# Patient Record
Sex: FEMALE | Race: OTHER | URBAN - METROPOLITAN AREA
[De-identification: names, ages, dates, MRNs, and addresses within clinical notes are randomized per-mention and may not be internally consistent; named-entity substitution may affect disease eponyms.]

---

## 2017-04-03 ENCOUNTER — APPOINTMENT (RX ONLY)
Dept: URBAN - METROPOLITAN AREA OTHER 10 | Facility: OTHER | Age: 77
Setting detail: DERMATOLOGY
End: 2017-04-03

## 2017-04-03 DIAGNOSIS — L30.1 DYSHIDROSIS [POMPHOLYX]: ICD-10-CM

## 2017-04-03 PROBLEM — E78.5 HYPERLIPIDEMIA, UNSPECIFIED: Status: ACTIVE | Noted: 2017-04-03

## 2017-04-03 PROBLEM — I10 ESSENTIAL (PRIMARY) HYPERTENSION: Status: ACTIVE | Noted: 2017-04-03

## 2017-04-03 PROCEDURE — ? PRESCRIPTION

## 2017-04-03 PROCEDURE — ? TREATMENT REGIMEN

## 2017-04-03 PROCEDURE — ? COUNSELING

## 2017-04-03 PROCEDURE — 99202 OFFICE O/P NEW SF 15 MIN: CPT

## 2017-04-03 RX ORDER — CLOBETASOL PROPIONATE 0.5 MG/G
CREAM TOPICAL BID
Qty: 60 | Refills: 0 | Status: ERX | COMMUNITY
Start: 2017-04-03

## 2017-04-03 RX ADMIN — CLOBETASOL PROPIONATE: 0.5 CREAM TOPICAL at 00:00

## 2017-04-03 ASSESSMENT — LOCATION ZONE DERM: LOCATION ZONE: HAND

## 2017-04-03 ASSESSMENT — LOCATION SIMPLE DESCRIPTION DERM
LOCATION SIMPLE: LEFT HAND
LOCATION SIMPLE: RIGHT HAND

## 2017-04-03 ASSESSMENT — LOCATION DETAILED DESCRIPTION DERM
LOCATION DETAILED: RIGHT RADIAL DORSAL HAND
LOCATION DETAILED: LEFT RADIAL DORSAL HAND

## 2017-04-03 NOTE — HPI: RASH
How Severe Is Your Rash?: mild
Is This A New Presentation, Or A Follow-Up?: Rash
Additional History: \\n\\nNew patient. Focused visit. Painful, cracking hands ,patient's primary gave cream but doesn't know the name

## 2017-04-03 NOTE — PROCEDURE: TREATMENT REGIMEN
Detail Level: Zone
Initiate Treatment: Clobetasol cream- apply to affected areas of hands twice daily for two weeks then PRN flares only, moisturize right after application and after washing hands, wear gloves while washing dishes

## 2017-04-24 ENCOUNTER — APPOINTMENT (RX ONLY)
Dept: URBAN - METROPOLITAN AREA OTHER 10 | Facility: OTHER | Age: 77
Setting detail: DERMATOLOGY
End: 2017-04-24

## 2017-04-24 DIAGNOSIS — L82.1 OTHER SEBORRHEIC KERATOSIS: ICD-10-CM

## 2017-04-24 DIAGNOSIS — L30.1 DYSHIDROSIS [POMPHOLYX]: ICD-10-CM | Status: IMPROVED

## 2017-04-24 PROCEDURE — ? TREATMENT REGIMEN

## 2017-04-24 PROCEDURE — 99213 OFFICE O/P EST LOW 20 MIN: CPT

## 2017-04-24 PROCEDURE — ? COUNSELING

## 2017-04-24 ASSESSMENT — LOCATION DETAILED DESCRIPTION DERM
LOCATION DETAILED: RIGHT RADIAL DORSAL HAND
LOCATION DETAILED: LEFT RADIAL DORSAL HAND

## 2017-04-24 ASSESSMENT — LOCATION ZONE DERM: LOCATION ZONE: HAND

## 2017-04-24 ASSESSMENT — LOCATION SIMPLE DESCRIPTION DERM
LOCATION SIMPLE: RIGHT HAND
LOCATION SIMPLE: LEFT HAND

## 2017-04-24 NOTE — PROCEDURE: TREATMENT REGIMEN
Otc Regimen: Gold bond cream
Continue Regimen: Clobetasol cream- apply to affected areas of hands twice daily for two weeks when flared only then Monday,Wednesday and Friday. Moisturize right after application and after washing hands, wear gloves while washing dishes
Detail Level: Zone

## 2017-11-14 RX ORDER — CLOBETASOL PROPIONATE 0.5 MG/G
CREAM TOPICAL BID
Qty: 60 | Refills: 0 | Status: ERX

## 2019-02-25 ENCOUNTER — APPOINTMENT (RX ONLY)
Dept: URBAN - METROPOLITAN AREA OTHER 10 | Facility: OTHER | Age: 79
Setting detail: DERMATOLOGY
End: 2019-02-25

## 2019-02-25 DIAGNOSIS — L84 CORNS AND CALLOSITIES: ICD-10-CM

## 2019-02-25 DIAGNOSIS — K13.0 DISEASES OF LIPS: ICD-10-CM

## 2019-02-25 PROBLEM — K21.9 GASTRO-ESOPHAGEAL REFLUX DISEASE WITHOUT ESOPHAGITIS: Status: ACTIVE | Noted: 2019-02-25

## 2019-02-25 PROCEDURE — 99213 OFFICE O/P EST LOW 20 MIN: CPT

## 2019-02-25 PROCEDURE — ? COUNSELING

## 2019-02-25 PROCEDURE — ? TREATMENT REGIMEN

## 2019-02-25 PROCEDURE — ? PRESCRIPTION

## 2019-02-25 RX ORDER — DESONIDE 0.5 MG/G
OINTMENT TOPICAL
Qty: 60 | Refills: 0 | Status: ERX | COMMUNITY
Start: 2019-02-25

## 2019-02-25 RX ADMIN — DESONIDE: 0.5 OINTMENT TOPICAL at 00:00

## 2019-02-25 ASSESSMENT — LOCATION DETAILED DESCRIPTION DERM
LOCATION DETAILED: LEFT ULNAR PALM
LOCATION DETAILED: LEFT INFERIOR VERMILION LIP
LOCATION DETAILED: RIGHT SUPERIOR VERMILION LIP
LOCATION DETAILED: LEFT SUPERIOR VERMILION LIP
LOCATION DETAILED: RIGHT ULNAR PALM
LOCATION DETAILED: RIGHT SUPERIOR VERMILION BORDER

## 2019-02-25 ASSESSMENT — LOCATION ZONE DERM
LOCATION ZONE: LIP
LOCATION ZONE: HAND

## 2019-02-25 ASSESSMENT — LOCATION SIMPLE DESCRIPTION DERM
LOCATION SIMPLE: LEFT HAND
LOCATION SIMPLE: LEFT LIP
LOCATION SIMPLE: RIGHT UPPER LIP
LOCATION SIMPLE: RIGHT HAND
LOCATION SIMPLE: RIGHT LIP

## 2019-02-25 NOTE — PROCEDURE: TREATMENT REGIMEN
Initiate Treatment: Desonide ointment bid x 2 weeks
Detail Level: Simple
Otc Regimen: Vaseline, dove
Discontinue Regimen: Blistex cobyeva

## 2019-02-25 NOTE — HPI: DISCOLORATION
How Severe Is Your Skin Discoloration?: moderate
Additional History: Lips are dry and when symptoms first started they would burn. She has applied abreva in the past. She applies Vaseline and blistex on lips.

## 2019-03-18 ENCOUNTER — APPOINTMENT (RX ONLY)
Dept: URBAN - METROPOLITAN AREA OTHER 10 | Facility: OTHER | Age: 79
Setting detail: DERMATOLOGY
End: 2019-03-18

## 2019-03-18 DIAGNOSIS — K13.0 DISEASES OF LIPS: ICD-10-CM | Status: WELL CONTROLLED

## 2019-03-18 PROCEDURE — 99213 OFFICE O/P EST LOW 20 MIN: CPT

## 2019-03-18 PROCEDURE — ? TREATMENT REGIMEN

## 2019-03-18 PROCEDURE — ? COUNSELING

## 2019-03-18 ASSESSMENT — LOCATION DETAILED DESCRIPTION DERM
LOCATION DETAILED: RIGHT SUPERIOR VERMILION LIP
LOCATION DETAILED: LEFT INFERIOR VERMILION LIP
LOCATION DETAILED: LEFT SUPERIOR VERMILION LIP
LOCATION DETAILED: RIGHT SUPERIOR VERMILION BORDER

## 2019-03-18 ASSESSMENT — LOCATION SIMPLE DESCRIPTION DERM
LOCATION SIMPLE: RIGHT UPPER LIP
LOCATION SIMPLE: LEFT LIP
LOCATION SIMPLE: RIGHT LIP

## 2019-03-18 ASSESSMENT — LOCATION ZONE DERM: LOCATION ZONE: LIP

## 2019-03-18 NOTE — PROCEDURE: TREATMENT REGIMEN
Modify Regimen: Desonide ointment bid x 2 weeks as needed for flares
Otc Regimen: Continue applying Vaseline once daily
Detail Level: Simple
Plan: Pt was advised to start keeping a food journal to see what could possibly be causing flare ups

## 2020-04-22 ENCOUNTER — APPOINTMENT (RX ONLY)
Dept: URBAN - METROPOLITAN AREA OTHER 10 | Facility: OTHER | Age: 80
Setting detail: DERMATOLOGY
End: 2020-04-22

## 2020-04-22 DIAGNOSIS — L81.0 POSTINFLAMMATORY HYPERPIGMENTATION: ICD-10-CM

## 2020-04-22 PROCEDURE — 99213 OFFICE O/P EST LOW 20 MIN: CPT

## 2020-04-22 PROCEDURE — ? COUNSELING

## 2020-04-22 PROCEDURE — ? ORDER TESTS

## 2020-04-22 PROCEDURE — ? TREATMENT REGIMEN

## 2020-04-22 ASSESSMENT — LOCATION DETAILED DESCRIPTION DERM
LOCATION DETAILED: LEFT INFERIOR VERMILION LIP
LOCATION DETAILED: RIGHT INFERIOR VERMILION LIP

## 2020-04-22 ASSESSMENT — LOCATION SIMPLE DESCRIPTION DERM
LOCATION SIMPLE: RIGHT LIP
LOCATION SIMPLE: LEFT LIP

## 2020-04-22 ASSESSMENT — LOCATION ZONE DERM: LOCATION ZONE: LIP

## 2020-04-22 NOTE — PROCEDURE: COUNSELING
Detail Level: Simple
Patient Specific Counseling (Will Not Stick From Patient To Patient): \\nPatient denies associated symptoms with discoloration.  Denies exposure c/w fixed drug.  Denies itching, flaking or irritation c/w contact.  No use of lipstick or lip balm.  She is using whitening tooth past.  \\nadvised to use plain toothpaste only - avoiding all additives.\\n\\nWill check baseline labs for evidence of underlying systemic disease, particularly Addisons.

## 2020-04-22 NOTE — PROCEDURE: ORDER TESTS
Performing Laboratory: [id number]
Bill For Surgical Tray: no
Billing Type: Third-Party Bill
Expected Date Of Service: 04/22/2020
Lab Facility: [id number]
Clinical Notes (To The Lab): plasma Cortisol

## 2020-06-24 ENCOUNTER — ERX REFILL RESPONSE (OUTPATIENT)
Age: 80
End: 2020-06-24

## 2020-06-24 RX ORDER — DICYCLOMINE HYDROCHLORIDE 20 MG/1
TAKE 1 TABLET(20 MG) BY MOUTH FOUR TIMES DAILY TABLET ORAL
Qty: 120 | Refills: 0

## 2020-10-27 ENCOUNTER — ERX REFILL RESPONSE (OUTPATIENT)
Age: 80
End: 2020-10-27

## 2020-10-27 RX ORDER — MESALAMINE 375 MG/1
TAKE 4 CAPSULES(1500 MG) BY MOUTH EVERY DAY IN THE MORNING CAPSULE, EXTENDED RELEASE ORAL
Qty: 120 | Refills: 0

## 2021-03-02 ENCOUNTER — ERX REFILL RESPONSE (OUTPATIENT)
Dept: URBAN - METROPOLITAN AREA CLINIC 40 | Facility: CLINIC | Age: 81
End: 2021-03-02

## 2021-03-02 RX ORDER — DICYCLOMINE HYDROCHLORIDE 20 MG/1
TAKE 1 TABLET(20 MG) BY MOUTH FOUR TIMES DAILY TABLET ORAL
Qty: 120 | Refills: 0

## 2021-03-24 ENCOUNTER — P2P PATIENT RECORD (OUTPATIENT)
Age: 81
End: 2021-03-24

## 2021-03-25 ENCOUNTER — OFFICE VISIT (OUTPATIENT)
Dept: URBAN - METROPOLITAN AREA CLINIC 74 | Facility: CLINIC | Age: 81
End: 2021-03-25
Payer: COMMERCIAL

## 2021-03-25 ENCOUNTER — LAB OUTSIDE AN ENCOUNTER (OUTPATIENT)
Dept: URBAN - METROPOLITAN AREA CLINIC 74 | Facility: CLINIC | Age: 81
End: 2021-03-25

## 2021-03-25 ENCOUNTER — WEB ENCOUNTER (OUTPATIENT)
Dept: URBAN - METROPOLITAN AREA CLINIC 74 | Facility: CLINIC | Age: 81
End: 2021-03-25

## 2021-03-25 DIAGNOSIS — K51.90 ULCERATIVE COLITIS: ICD-10-CM

## 2021-03-25 PROCEDURE — 99214 OFFICE O/P EST MOD 30 MIN: CPT | Performed by: INTERNAL MEDICINE

## 2021-03-25 RX ORDER — DICYCLOMINE HYDROCHLORIDE 20 MG/1
TAKE 1 TABLET(20 MG) BY MOUTH FOUR TIMES DAILY TABLET ORAL
Qty: 120 | Refills: 0 | Status: DISCONTINUED | COMMUNITY

## 2021-03-25 RX ORDER — ALENDRONATE SODIUM 70 MG/1
TABLET ORAL
Qty: 0 | Refills: 0 | Status: ACTIVE | COMMUNITY
Start: 1900-01-01

## 2021-03-25 RX ORDER — OMEPRAZOLE 20 MG/1
CAPSULE, DELAYED RELEASE ORAL
Qty: 0 | Refills: 0 | Status: ACTIVE | COMMUNITY
Start: 1900-01-01

## 2021-03-25 RX ORDER — ASPIRIN 81 MG/1
TABLET, COATED ORAL
Qty: 0 | Refills: 0 | Status: ACTIVE | COMMUNITY
Start: 1900-01-01

## 2021-03-25 RX ORDER — ZOLPIDEM TARTRATE 10 MG/1
TABLET, FILM COATED ORAL
Qty: 0 | Refills: 0 | Status: ACTIVE | COMMUNITY
Start: 1900-01-01

## 2021-03-25 RX ORDER — MESALAMINE 375 MG/1
TAKE 4 CAPSULES (1,500 MG) BY ORAL ROUTE ONCE DAILY IN THE MORNING FOR 30 DAYS CAPSULE, EXTENDED RELEASE ORAL
Qty: 120 | Refills: 0
Start: 2020-04-27

## 2021-03-25 RX ORDER — MESALAMINE 375 MG/1
TAKE 4 CAPSULES (1,500 MG) BY ORAL ROUTE ONCE DAILY IN THE MORNING FOR 30 DAYS CAPSULE, EXTENDED RELEASE ORAL
Qty: 120 | Refills: 5 | Status: ACTIVE | COMMUNITY
Start: 2020-04-27

## 2021-03-25 RX ORDER — SIMVASTATIN 40 MG/1
TABLET, FILM COATED ORAL
Qty: 0 | Refills: 0 | Status: ACTIVE | COMMUNITY
Start: 1900-01-01

## 2021-03-25 RX ORDER — HYDROCHLOROTHIAZIDE 25 MG/1
TABLET ORAL
Qty: 0 | Refills: 0 | Status: ACTIVE | COMMUNITY
Start: 1900-01-01

## 2021-03-25 RX ORDER — SODIUM, POTASSIUM,MAG SULFATES 17.5-3.13G
17.5-13.3-1.6 GM/177ML SOLUTION, RECONSTITUTED, ORAL ORAL
Qty: 354 MILILITER | Refills: 0 | OUTPATIENT
Start: 2021-03-25 | End: 2021-03-26

## 2021-03-25 RX ORDER — MESALAMINE 375 MG/1
TAKE 4 CAPSULES(1500 MG) BY MOUTH EVERY DAY IN THE MORNING CAPSULE, EXTENDED RELEASE ORAL
Qty: 120 | Refills: 0 | Status: DISCONTINUED | COMMUNITY

## 2021-03-25 NOTE — PHYSICAL EXAM CONSTITUTIONAL:
Overweight female well developed, well nourished , in no acute distress , ambulating without difficulty , normal communication ability

## 2021-03-27 LAB
A/G RATIO: 1.2
ALBUMIN: 3.8
ALKALINE PHOSPHATASE: 75
ALT (SGPT): 14
APPEARANCE: CLEAR
AST (SGOT): 17
BACTERIA: (no result)
BASO (ABSOLUTE): 0.1
BASOS: 1
BILIRUBIN, TOTAL: 0.2
BILIRUBIN: NEGATIVE
BUN/CREATININE RATIO: 16
BUN: 16
C-REACTIVE PROTEIN, QUANT: 6
CALCIUM: 10.1
CARBON DIOXIDE, TOTAL: 23
CAST TYPE: (no result)
CASTS: PRESENT
CHLORIDE: 106
COMMENT: (no result)
CREATININE: 1.03
CRYSTAL TYPE: (no result)
CRYSTALS: (no result)
EGFR IF AFRICN AM: 60
EGFR IF NONAFRICN AM: 52
EOS (ABSOLUTE): 0
EOS: 0
EPITHELIAL CELLS (NON RENAL): (no result)
EPITHELIAL CELLS (RENAL): (no result)
GLOBULIN, TOTAL: 3.2
GLUCOSE: 128
GLUCOSE: NEGATIVE
HEMATOCRIT: 40
HEMATOLOGY COMMENTS:: (no result)
HEMOGLOBIN: 13.4
IMMATURE CELLS: (no result)
IMMATURE GRANS (ABS): 0
IMMATURE GRANULOCYTES: 0
KETONES: NEGATIVE
LYMPHS (ABSOLUTE): 2.5
LYMPHS: 26
MCH: 29.4
MCHC: 33.5
MCV: 88
MICROSCOPIC EXAMINATION: (no result)
MICROSCOPIC EXAMINATION: (no result)
MONOCYTES(ABSOLUTE): 0.7
MONOCYTES: 7
MUCUS THREADS: PRESENT
NEUTROPHILS (ABSOLUTE): 6.4
NEUTROPHILS: 66
NITRITE, URINE: NEGATIVE
NRBC: (no result)
OCCULT BLOOD: NEGATIVE
PH: 7
PLATELETS: 167
POTASSIUM: 3.9
PROTEIN, TOTAL: 7
PROTEIN: NEGATIVE
RBC: (no result)
RBC: 4.56
RDW: 12.5
SODIUM: 144
SPECIFIC GRAVITY: 1.01
TRICHOMONAS: (no result)
URINE-COLOR: YELLOW
UROBILINOGEN,SEMI-QN: 0.2
WBC ESTERASE: (no result)
WBC: (no result)
WBC: 9.6
YEAST: (no result)

## 2021-04-05 ENCOUNTER — TELEPHONE ENCOUNTER (OUTPATIENT)
Dept: URBAN - METROPOLITAN AREA CLINIC 40 | Facility: CLINIC | Age: 81
End: 2021-04-05

## 2021-04-05 RX ORDER — DICYCLOMINE HYDROCHLORIDE 20 MG/1
1 TABLET TABLET ORAL
Qty: 360 TABLET | Refills: 0

## 2021-07-14 ENCOUNTER — TELEPHONE ENCOUNTER (OUTPATIENT)
Dept: URBAN - METROPOLITAN AREA CLINIC 98 | Facility: CLINIC | Age: 81
End: 2021-07-14

## 2021-07-14 RX ORDER — DICYCLOMINE HYDROCHLORIDE 20 MG/1
1 TABLET TABLET ORAL
Qty: 360 TABLET | Refills: 0
End: 2021-10-12

## 2021-10-14 ENCOUNTER — TELEPHONE ENCOUNTER (OUTPATIENT)
Dept: URBAN - METROPOLITAN AREA SURGERY CENTER 30 | Facility: SURGERY CENTER | Age: 81
End: 2021-10-14

## 2021-10-14 RX ORDER — DICYCLOMINE HYDROCHLORIDE 20 MG/1
1 TABLET TABLET ORAL
Qty: 360 TABLET | Refills: 3

## 2021-10-18 ENCOUNTER — TELEPHONE ENCOUNTER (OUTPATIENT)
Dept: URBAN - METROPOLITAN AREA CLINIC 40 | Facility: CLINIC | Age: 81
End: 2021-10-18

## 2021-10-18 ENCOUNTER — TELEPHONE ENCOUNTER (OUTPATIENT)
Dept: URBAN - METROPOLITAN AREA CLINIC 74 | Facility: CLINIC | Age: 81
End: 2021-10-18

## 2021-11-08 ENCOUNTER — ERX REFILL RESPONSE (OUTPATIENT)
Dept: URBAN - METROPOLITAN AREA CLINIC 40 | Facility: CLINIC | Age: 81
End: 2021-11-08

## 2021-11-08 ENCOUNTER — OFFICE VISIT (OUTPATIENT)
Dept: URBAN - METROPOLITAN AREA TELEHEALTH 2 | Facility: TELEHEALTH | Age: 81
End: 2021-11-08
Payer: COMMERCIAL

## 2021-11-08 ENCOUNTER — TELEPHONE ENCOUNTER (OUTPATIENT)
Dept: URBAN - METROPOLITAN AREA CLINIC 74 | Facility: CLINIC | Age: 81
End: 2021-11-08

## 2021-11-08 DIAGNOSIS — K51.80 CHRONIC PANCOLONIC ULCERATIVE COLITIS: ICD-10-CM

## 2021-11-08 DIAGNOSIS — K21.9 GERD: ICD-10-CM

## 2021-11-08 PROCEDURE — 99213 OFFICE O/P EST LOW 20 MIN: CPT | Performed by: PHYSICIAN ASSISTANT

## 2021-11-08 RX ORDER — TRAMADOL HYDROCHLORIDE 50 MG/1
1 TABLET AS NEEDED TABLET, FILM COATED ORAL ONCE A DAY
Status: ACTIVE | COMMUNITY

## 2021-11-08 RX ORDER — OMEPRAZOLE 40 MG/1
1 CAPSULE 30 MINUTES BEFORE MORNING MEAL CAPSULE, DELAYED RELEASE ORAL ONCE A DAY
Qty: 30 | Refills: 0

## 2021-11-08 RX ORDER — MESALAMINE 375 MG/1
TAKE 4 CAPSULES (1,500 MG) BY ORAL ROUTE ONCE DAILY IN THE MORNING FOR 30 DAYS CAPSULE, EXTENDED RELEASE ORAL
Qty: 120 | Refills: 0 | Status: ACTIVE | COMMUNITY
Start: 2020-04-27

## 2021-11-08 RX ORDER — HYDROCHLOROTHIAZIDE 25 MG/1
TABLET ORAL
Qty: 0 | Refills: 0 | Status: ACTIVE | COMMUNITY
Start: 1900-01-01

## 2021-11-08 RX ORDER — IRBESARTAN 150 MG/1
1 TABLET TABLET, FILM COATED ORAL ONCE A DAY
Refills: 0 | Status: ACTIVE | COMMUNITY
Start: 1900-01-01

## 2021-11-08 RX ORDER — ASPIRIN 81 MG/1
TABLET, COATED ORAL
Qty: 0 | Refills: 0 | Status: ACTIVE | COMMUNITY
Start: 1900-01-01

## 2021-11-08 RX ORDER — OMEPRAZOLE 40 MG/1
1 CAPSULE 30 MINUTES BEFORE MORNING MEAL CAPSULE, DELAYED RELEASE ORAL ONCE A DAY
Qty: 30 | Refills: 0 | OUTPATIENT

## 2021-11-08 RX ORDER — ZOLPIDEM TARTRATE 5 MG/1
1 TABLET AT BEDTIME AS NEEDED TABLET, FILM COATED ORAL ONCE A DAY
Refills: 0 | Status: ACTIVE | COMMUNITY
Start: 1900-01-01

## 2021-11-08 RX ORDER — OMEPRAZOLE 40 MG/1
1 CAPSULE 30 MINUTES BEFORE MORNING MEAL CAPSULE, DELAYED RELEASE ORAL ONCE A DAY
Refills: 0 | Status: ACTIVE | COMMUNITY
Start: 1900-01-01

## 2021-11-08 RX ORDER — OMEPRAZOLE 40 MG/1
TAKE 1 CAPSULE BY MOUTH EVERY DAY 30 MINUTES BEFORE BREAKFAST CAPSULE, DELAYED RELEASE ORAL
Qty: 90 CAPSULE | Refills: 1 | OUTPATIENT

## 2021-11-08 RX ORDER — MESALAMINE 375 MG/1
4 CAPSULES IN THE MORNING CAPSULE, EXTENDED RELEASE ORAL ONCE A DAY
Qty: 120 CAPSULE | Refills: 0
Start: 2020-04-27 | End: 2021-12-08

## 2021-11-08 RX ORDER — POLYETHYLENE GLYCOL 3350, SODIUM SULFATE, SODIUM CHLORIDE, POTASSIUM CHLORIDE, ASCORBIC ACID, SODIUM ASCORBATE 140-9-5.2G
AS DIRECTED KIT ORAL ONCE
Qty: 1 CONTAINER | Refills: 0 | OUTPATIENT

## 2021-11-08 RX ORDER — ALENDRONATE SODIUM 70 MG/1
TABLET ORAL
Qty: 0 | Refills: 0 | Status: ACTIVE | COMMUNITY
Start: 1900-01-01

## 2021-11-08 RX ORDER — DICYCLOMINE HYDROCHLORIDE 20 MG/1
1 TABLET TABLET ORAL
Qty: 360 TABLET | Refills: 3 | Status: DISCONTINUED | COMMUNITY

## 2021-11-08 RX ORDER — SIMVASTATIN 40 MG/1
TABLET, FILM COATED ORAL
Qty: 0 | Refills: 0 | Status: ACTIVE | COMMUNITY
Start: 1900-01-01

## 2021-11-08 NOTE — HPI-TODAY'S VISIT:
Ms. Baez is a 81 year old female who presents to telehealth visit for follow-up.  We have been following her for ulcerative colitis, last seen by Dr. Wood 3/25/21.  She has been managed on Apriso.  Patient states she is still doing well with that having a bowel movement 1-2 times a day.  She denies any rectal bleeding.  Denies any abdominal pain.  She denies any nausea.  She denies any reflux.  She is due for surveillance colonoscopy.  Colonoscopy in April 2019 was significant for tubular adenomas removed.  Her surveillance biopsies were negative for active colitis.  She denies any extraintestinal manifestations such as mouth ulcers or sores.  She has had some issues with change in her skin color around her lips and planned to have a dermatology referral. Does well on dicyclomine prn abdominal spasms. Admits mild depression, not sleeping well. Appetite is good. Weight stable. Has plans for bilateral eye surgery next month. Continues omeprazole daily. Has not completed bloodwork since earlier this year, February, she believes. However, she completed labs with us in March, mildly elevated Cr of 1.03, decrease of eGFR. Normal HFP, CBC and CRP.

## 2021-12-18 LAB
A/G RATIO: 1.5
ALBUMIN: 4
ALKALINE PHOSPHATASE: 68
ALT (SGPT): 12
APPEARANCE: CLEAR
AST (SGOT): 14
BACTERIA: (no result)
BILIRUBIN, TOTAL: 0.3
BILIRUBIN: NEGATIVE
BUN/CREATININE RATIO: 13
BUN: 12
C-REACTIVE PROTEIN, QUANT: 4
CALCIUM: 10.1
CARBON DIOXIDE, TOTAL: 26
CAST TYPE: (no result)
CASTS: (no result)
CHLORIDE: 107
COMMENT: (no result)
CREATININE: 0.94
CRYSTAL TYPE: (no result)
CRYSTALS: (no result)
EGFR IF AFRICN AM: 67
EGFR IF NONAFRICN AM: 58
EPITHELIAL CELLS (NON RENAL): >10
EPITHELIAL CELLS (RENAL): (no result)
GLOBULIN, TOTAL: 2.7
GLUCOSE: 128
GLUCOSE: NEGATIVE
HEMATOCRIT: 39.3
HEMOGLOBIN: 12.8
KETONES: NEGATIVE
MCH: 28.4
MCHC: 32.6
MCV: 87
MICROSCOPIC EXAMINATION: (no result)
MUCUS THREADS: (no result)
NITRITE, URINE: NEGATIVE
NRBC: (no result)
OCCULT BLOOD: NEGATIVE
PH: 6.5
PLATELETS: 167
POTASSIUM: 4.4
PROTEIN, TOTAL: 6.7
PROTEIN: NEGATIVE
RBC: (no result)
RBC: 4.5
RDW: 12.7
SODIUM: 145
SPECIFIC GRAVITY: 1.01
TRICHOMONAS: (no result)
URINE-COLOR: YELLOW
UROBILINOGEN,SEMI-QN: 0.2
WBC ESTERASE: (no result)
WBC: (no result)
WBC: 7.3
YEAST: (no result)

## 2021-12-29 ENCOUNTER — TELEPHONE ENCOUNTER (OUTPATIENT)
Dept: URBAN - METROPOLITAN AREA CLINIC 40 | Facility: CLINIC | Age: 81
End: 2021-12-29

## 2021-12-29 RX ORDER — DICYCLOMINE HYDROCHLORIDE 20 MG/1
1 TABLET TABLET ORAL
Qty: 360 TABLET | Refills: 0

## 2022-01-24 ENCOUNTER — OFFICE VISIT (OUTPATIENT)
Dept: URBAN - METROPOLITAN AREA SURGERY CENTER 30 | Facility: SURGERY CENTER | Age: 82
End: 2022-01-24
Payer: COMMERCIAL

## 2022-01-24 DIAGNOSIS — D12.6 ADENOMA DETERMINED BY COLORECTAL BIOPSY: ICD-10-CM

## 2022-01-24 DIAGNOSIS — K51.00 ACUTE ULCERATIVE PANCOLITIS: ICD-10-CM

## 2022-01-24 PROCEDURE — 45380 COLONOSCOPY AND BIOPSY: CPT | Performed by: INTERNAL MEDICINE

## 2022-01-24 PROCEDURE — G8907 PT DOC NO EVENTS ON DISCHARG: HCPCS | Performed by: INTERNAL MEDICINE

## 2022-02-03 ENCOUNTER — TELEPHONE ENCOUNTER (OUTPATIENT)
Dept: URBAN - METROPOLITAN AREA CLINIC 40 | Facility: CLINIC | Age: 82
End: 2022-02-03

## 2022-02-03 RX ORDER — MESALAMINE 375 MG/1
4 CAPSULES IN THE MORNING CAPSULE, EXTENDED RELEASE ORAL ONCE A DAY
Qty: 360 | Refills: 1
Start: 2020-04-27 | End: 2022-08-02

## 2022-02-10 ENCOUNTER — ERX REFILL RESPONSE (OUTPATIENT)
Dept: URBAN - METROPOLITAN AREA CLINIC 40 | Facility: CLINIC | Age: 82
End: 2022-02-10

## 2022-02-10 RX ORDER — OMEPRAZOLE 40 MG/1
TAKE 1 CAPSULE BY MOUTH EVERY DAY 30 MINUTES BEFORE BREAKFAST CAPSULE, DELAYED RELEASE ORAL
Qty: 90 CAPSULE | Refills: 1 | OUTPATIENT

## 2022-02-17 ENCOUNTER — OFFICE VISIT (OUTPATIENT)
Dept: URBAN - METROPOLITAN AREA CLINIC 74 | Facility: CLINIC | Age: 82
End: 2022-02-17
Payer: COMMERCIAL

## 2022-02-17 DIAGNOSIS — K21.9 GERD: ICD-10-CM

## 2022-02-17 DIAGNOSIS — K51.90 ULCERATIVE COLITIS: ICD-10-CM

## 2022-02-17 PROCEDURE — 99213 OFFICE O/P EST LOW 20 MIN: CPT | Performed by: INTERNAL MEDICINE

## 2022-02-17 RX ORDER — IRBESARTAN 150 MG/1
1 TABLET TABLET, FILM COATED ORAL ONCE A DAY
Refills: 0 | Status: ACTIVE | COMMUNITY
Start: 1900-01-01

## 2022-02-17 RX ORDER — ALENDRONATE SODIUM 70 MG/1
TABLET ORAL
Qty: 0 | Refills: 0 | Status: ACTIVE | COMMUNITY
Start: 1900-01-01

## 2022-02-17 RX ORDER — MESALAMINE 375 MG/1
4 CAPSULES IN THE MORNING CAPSULE, EXTENDED RELEASE ORAL ONCE A DAY
Qty: 360 | Refills: 1 | Status: ACTIVE | COMMUNITY
Start: 2020-04-27 | End: 2022-08-02

## 2022-02-17 RX ORDER — ZOLPIDEM TARTRATE 5 MG/1
1 TABLET AT BEDTIME AS NEEDED TABLET, FILM COATED ORAL ONCE A DAY
Refills: 0 | Status: ACTIVE | COMMUNITY
Start: 1900-01-01

## 2022-02-17 RX ORDER — SIMVASTATIN 40 MG/1
TABLET, FILM COATED ORAL
Qty: 0 | Refills: 0 | Status: ACTIVE | COMMUNITY
Start: 1900-01-01

## 2022-02-17 RX ORDER — DICYCLOMINE HYDROCHLORIDE 20 MG/1
1 TABLET TABLET ORAL
Qty: 360 TABLET | Refills: 0 | Status: ACTIVE | COMMUNITY

## 2022-02-17 RX ORDER — MESALAMINE 375 MG/1
4 CAPSULES IN THE MORNING CAPSULE, EXTENDED RELEASE ORAL ONCE A DAY
OUTPATIENT
Start: 2020-04-27

## 2022-02-17 RX ORDER — OMEPRAZOLE 40 MG/1
TAKE 1 CAPSULE BY MOUTH EVERY DAY 30 MINUTES BEFORE BREAKFAST CAPSULE, DELAYED RELEASE ORAL
OUTPATIENT

## 2022-02-17 RX ORDER — TRAMADOL HYDROCHLORIDE 50 MG/1
1 TABLET AS NEEDED TABLET, FILM COATED ORAL ONCE A DAY
Status: ACTIVE | COMMUNITY

## 2022-02-17 RX ORDER — ASPIRIN 81 MG/1
TABLET, COATED ORAL
Qty: 0 | Refills: 0 | Status: ACTIVE | COMMUNITY
Start: 1900-01-01

## 2022-02-17 RX ORDER — OMEPRAZOLE 40 MG/1
TAKE 1 CAPSULE BY MOUTH EVERY DAY 30 MINUTES BEFORE BREAKFAST CAPSULE, DELAYED RELEASE ORAL
Qty: 90 CAPSULE | Refills: 1 | Status: ACTIVE | COMMUNITY

## 2022-02-17 RX ORDER — POLYETHYLENE GLYCOL 3350, SODIUM SULFATE, SODIUM CHLORIDE, POTASSIUM CHLORIDE, ASCORBIC ACID, SODIUM ASCORBATE 140-9-5.2G
AS DIRECTED KIT ORAL ONCE
Qty: 1 CONTAINER | Refills: 0 | Status: DISCONTINUED | COMMUNITY

## 2022-02-17 RX ORDER — HYDROCHLOROTHIAZIDE 25 MG/1
TABLET ORAL
Qty: 0 | Refills: 0 | Status: ACTIVE | COMMUNITY
Start: 1900-01-01

## 2022-02-17 NOTE — PHYSICAL EXAM CONSTITUTIONAL:
Overweight female, well developed, well nourished , in no acute distress , ambulating without difficulty , normal communication ability

## 2022-02-17 NOTE — HPI-TODAY'S VISIT:
Ms. Baez presents to clinic for follow-up. She was last seen by the physician assistant on a telehealth appointment in November. Recall were following her for ulcerative colitis in remission on mesalamine. She also had a history of colon polyps. She is due for surveillance colonoscopy. This was performed on January 24. This was complete to the terminal ileum. Left-sided diverticulosis and internal hemorrhoids were noted. Biopsies of the right side of the colon showed a fragment of leiomyoma but otherwise no active disease. Left-sided colon biopsies were negative. Rectal biopsy showed hyperplastic change but no active inflammation. Patient is without complaint except for some occasional constipation. She denies any abdominal pain. She is on omeprazole for reflux. She plans to start weaning herself off of this.

## 2022-04-15 ENCOUNTER — ERX REFILL RESPONSE (OUTPATIENT)
Dept: URBAN - METROPOLITAN AREA CLINIC 40 | Facility: CLINIC | Age: 82
End: 2022-04-15

## 2022-04-15 RX ORDER — DICYCLOMINE HYDROCHLORIDE 20 MG/1
TAKE 1 TABLET BY MOUTH FOUR TIMES DAILY TABLET ORAL
Qty: 360 TABLET | Refills: 1 | OUTPATIENT

## 2022-04-15 RX ORDER — DICYCLOMINE HYDROCHLORIDE 20 MG/1
1 TABLET TABLET ORAL
Qty: 360 TABLET | Refills: 0 | OUTPATIENT

## 2022-05-10 ENCOUNTER — TELEPHONE ENCOUNTER (OUTPATIENT)
Dept: URBAN - METROPOLITAN AREA CLINIC 74 | Facility: CLINIC | Age: 82
End: 2022-05-10

## 2022-05-10 RX ORDER — OMEPRAZOLE 40 MG/1
1 CAPSULE 30 MINUTES BEFORE MORNING MEAL CAPSULE, DELAYED RELEASE ORAL ONCE A DAY
Qty: 90 | Refills: 1

## 2022-07-12 ENCOUNTER — ERX REFILL RESPONSE (OUTPATIENT)
Dept: URBAN - METROPOLITAN AREA CLINIC 40 | Facility: CLINIC | Age: 82
End: 2022-07-12

## 2022-07-12 RX ORDER — DICYCLOMINE HYDROCHLORIDE 20 MG/1
TAKE 1 TABLET BY MOUTH FOUR TIMES DAILY TABLET ORAL
Qty: 360 TABLET | Refills: 0 | OUTPATIENT

## 2022-07-12 RX ORDER — DICYCLOMINE HYDROCHLORIDE 20 MG/1
TAKE 1 TABLET BY MOUTH FOUR TIMES DAILY TABLET ORAL
Qty: 360 TABLET | Refills: 1 | OUTPATIENT

## 2022-08-05 ENCOUNTER — TELEPHONE ENCOUNTER (OUTPATIENT)
Dept: URBAN - METROPOLITAN AREA CLINIC 74 | Facility: CLINIC | Age: 82
End: 2022-08-05

## 2022-08-05 RX ORDER — MESALAMINE 375 MG/1
4 CAPSULES IN THE MORNING CAPSULE, EXTENDED RELEASE ORAL ONCE A DAY
Qty: 360 CAPSULE | Refills: 0
Start: 2020-04-27 | End: 2022-11-06

## 2022-08-18 ENCOUNTER — OFFICE VISIT (OUTPATIENT)
Dept: URBAN - METROPOLITAN AREA CLINIC 74 | Facility: CLINIC | Age: 82
End: 2022-08-18
Payer: COMMERCIAL

## 2022-08-18 VITALS
DIASTOLIC BLOOD PRESSURE: 76 MMHG | HEART RATE: 74 BPM | TEMPERATURE: 97.6 F | WEIGHT: 200 LBS | HEIGHT: 55 IN | BODY MASS INDEX: 46.29 KG/M2 | SYSTOLIC BLOOD PRESSURE: 124 MMHG

## 2022-08-18 DIAGNOSIS — K21.9 GERD: ICD-10-CM

## 2022-08-18 DIAGNOSIS — Z79.899 ENCOUNTER FOR LONG-TERM (CURRENT) DRUG USE: ICD-10-CM

## 2022-08-18 DIAGNOSIS — K51.90 ULCERATIVE COLITIS: ICD-10-CM

## 2022-08-18 PROCEDURE — 99213 OFFICE O/P EST LOW 20 MIN: CPT | Performed by: INTERNAL MEDICINE

## 2022-08-18 RX ORDER — OMEPRAZOLE 40 MG/1
1 CAPSULE 30 MINUTES BEFORE MORNING MEAL CAPSULE, DELAYED RELEASE ORAL ONCE A DAY
Qty: 90 | Refills: 1 | Status: ACTIVE | COMMUNITY

## 2022-08-18 RX ORDER — HYDROCHLOROTHIAZIDE 25 MG/1
TABLET ORAL
Qty: 0 | Refills: 0 | Status: ACTIVE | COMMUNITY
Start: 1900-01-01

## 2022-08-18 RX ORDER — OMEPRAZOLE 40 MG/1
1 CAPSULE 30 MINUTES BEFORE MORNING MEAL CAPSULE, DELAYED RELEASE ORAL ONCE A DAY
OUTPATIENT

## 2022-08-18 RX ORDER — ALENDRONATE SODIUM 70 MG/1
TABLET ORAL
Qty: 0 | Refills: 0 | Status: ACTIVE | COMMUNITY
Start: 1900-01-01

## 2022-08-18 RX ORDER — DICYCLOMINE HYDROCHLORIDE 20 MG/1
TAKE 1 TABLET BY MOUTH FOUR TIMES DAILY TABLET ORAL
Qty: 360 TABLET | Refills: 0
End: 2022-11-16

## 2022-08-18 RX ORDER — IRBESARTAN 150 MG/1
1 TABLET TABLET, FILM COATED ORAL ONCE A DAY
Refills: 0 | Status: ACTIVE | COMMUNITY
Start: 1900-01-01

## 2022-08-18 RX ORDER — DICYCLOMINE HYDROCHLORIDE 20 MG/1
TAKE 1 TABLET BY MOUTH FOUR TIMES DAILY TABLET ORAL
Qty: 360 TABLET | Refills: 0 | Status: ACTIVE | COMMUNITY

## 2022-08-18 RX ORDER — ZOLPIDEM TARTRATE 5 MG/1
1 TABLET AT BEDTIME AS NEEDED TABLET, FILM COATED ORAL ONCE A DAY
Refills: 0 | Status: ACTIVE | COMMUNITY
Start: 1900-01-01

## 2022-08-18 RX ORDER — MESALAMINE 375 MG/1
4 CAPSULES IN THE MORNING CAPSULE, EXTENDED RELEASE ORAL ONCE A DAY
Qty: 360 CAPSULE | Refills: 0 | Status: ACTIVE | COMMUNITY
Start: 2020-04-27 | End: 2022-11-06

## 2022-08-18 RX ORDER — TRAMADOL HYDROCHLORIDE 50 MG/1
1 TABLET AS NEEDED TABLET, FILM COATED ORAL ONCE A DAY
Status: ACTIVE | COMMUNITY

## 2022-08-18 RX ORDER — SIMVASTATIN 40 MG/1
TABLET, FILM COATED ORAL
Qty: 0 | Refills: 0 | Status: ACTIVE | COMMUNITY
Start: 1900-01-01

## 2022-08-18 RX ORDER — MESALAMINE 375 MG/1
4 CAPSULES IN THE MORNING CAPSULE, EXTENDED RELEASE ORAL ONCE A DAY
Qty: 360 | Refills: 0
Start: 2020-04-27 | End: 2022-11-16

## 2022-08-18 RX ORDER — ASPIRIN 81 MG/1
TABLET, COATED ORAL
Qty: 0 | Refills: 0 | Status: ACTIVE | COMMUNITY
Start: 1900-01-01

## 2022-08-18 NOTE — HPI-TODAY'S VISIT:
Ms. Baez presents to clinic for follow-up.  She was last seen in February.  We are following her for ulcerative colitis.  Last surveillance colonoscopy in January of this year showed she was in remission.  She is maintained on Apriso.  She states she still doing well.  She is moving her bowels once a day.  There is no blood in the stool or melena.  She denies any abdominal pain.  She denies any nausea or dysphagia.  With her reflux she has been able to wean the omeprazole to on-demand usage without any breakthrough.  She denies any extraintestinal manifestations such as mouth ulcers, rash or visual changes.

## 2022-08-20 LAB
A/G RATIO: 1.2
ABSOLUTE BASOPHILS: 48
ABSOLUTE EOSINOPHILS: 10
ABSOLUTE LYMPHOCYTES: 2573
ABSOLUTE MONOCYTES: 682
ABSOLUTE NEUTROPHILS: 6288
ALBUMIN: 3.8
ALKALINE PHOSPHATASE: 62
ALT (SGPT): 15
APPEARANCE: CLEAR
AST (SGOT): 17
BACTERIA: (no result)
BASOPHILS: 0.5
BILIRUBIN, TOTAL: 0.4
BILIRUBIN: NEGATIVE
BUN/CREATININE RATIO: 19
BUN: 21
C-REACTIVE PROTEIN, QUANT: 6.2
CALCIUM: 10.2
CARBON DIOXIDE, TOTAL: 26
CHLORIDE: 107
COLOR: YELLOW
CREATININE: 1.12
CULTURE: (no result)
EGFR: 50
EOSINOPHILS: 0.1
GLOBULIN, TOTAL: 3.1
GLUCOSE: 113
GLUCOSE: NEGATIVE
HEMATOCRIT: 40.1
HEMOGLOBIN: 13.2
HYALINE CAST: (no result)
KETONES: NEGATIVE
LEUKOCYTE ESTERASE: (no result)
LYMPHOCYTES: 26.8
MCH: 28.5
MCHC: 32.9
MCV: 86.6
MONOCYTES: 7.1
MPV: 12.4
NEUTROPHILS: 65.5
NITRITE: NEGATIVE
NOTE: (no result)
OCCULT BLOOD: NEGATIVE
PH: 7
PLATELET COUNT: 155
POTASSIUM: 4.2
PROTEIN, TOTAL: 6.9
PROTEIN: NEGATIVE
RBC: (no result)
RDW: 13.1
RED BLOOD CELL COUNT: 4.63
REFLEXIVE URINE CULTURE: (no result)
SODIUM: 141
SPECIFIC GRAVITY: 1.01
SQUAMOUS EPITHELIAL CELLS: (no result)
TRANSITIONAL EPITHELIAL: (no result)
WBC: (no result)
WHITE BLOOD CELL COUNT: 9.6

## 2022-11-01 ENCOUNTER — ERX REFILL RESPONSE (OUTPATIENT)
Dept: URBAN - METROPOLITAN AREA CLINIC 40 | Facility: CLINIC | Age: 82
End: 2022-11-01

## 2022-11-01 RX ORDER — DICYCLOMINE HYDROCHLORIDE 20 MG/1
TAKE 1 TABLET BY MOUTH FOUR TIMES DAILY TABLET ORAL
Qty: 360 TABLET | Refills: 0 | OUTPATIENT

## 2023-01-18 ENCOUNTER — ERX REFILL RESPONSE (OUTPATIENT)
Dept: URBAN - METROPOLITAN AREA CLINIC 40 | Facility: CLINIC | Age: 83
End: 2023-01-18

## 2023-01-18 RX ORDER — DICYCLOMINE HYDROCHLORIDE 20 MG/1
TAKE 1 TABLET BY MOUTH FOUR TIMES DAILY TABLET ORAL
Qty: 360 TABLET | Refills: 0 | OUTPATIENT

## 2023-02-16 ENCOUNTER — OFFICE VISIT (OUTPATIENT)
Dept: URBAN - METROPOLITAN AREA CLINIC 74 | Facility: CLINIC | Age: 83
End: 2023-02-16

## 2023-02-16 ENCOUNTER — OFFICE VISIT (OUTPATIENT)
Dept: URBAN - METROPOLITAN AREA CLINIC 74 | Facility: CLINIC | Age: 83
End: 2023-02-16
Payer: COMMERCIAL

## 2023-02-16 VITALS
DIASTOLIC BLOOD PRESSURE: 70 MMHG | TEMPERATURE: 97.2 F | HEART RATE: 73 BPM | OXYGEN SATURATION: 98 % | BODY MASS INDEX: 35.04 KG/M2 | WEIGHT: 151.4 LBS | HEIGHT: 55 IN | SYSTOLIC BLOOD PRESSURE: 126 MMHG

## 2023-02-16 DIAGNOSIS — Z79.899 ENCOUNTER FOR LONG-TERM (CURRENT) DRUG USE: ICD-10-CM

## 2023-02-16 DIAGNOSIS — K51.90 ULCERATIVE COLITIS: ICD-10-CM

## 2023-02-16 PROCEDURE — 99214 OFFICE O/P EST MOD 30 MIN: CPT | Performed by: INTERNAL MEDICINE

## 2023-02-16 RX ORDER — OMEPRAZOLE 40 MG/1
1 CAPSULE 30 MINUTES BEFORE MORNING MEAL CAPSULE, DELAYED RELEASE ORAL ONCE A DAY
Status: ACTIVE | COMMUNITY

## 2023-02-16 RX ORDER — DICYCLOMINE HYDROCHLORIDE 20 MG/1
TAKE 1 TABLET BY MOUTH FOUR TIMES DAILY TABLET ORAL
Qty: 360 TABLET | Refills: 0 | Status: ACTIVE | COMMUNITY

## 2023-02-16 RX ORDER — TRAMADOL HYDROCHLORIDE 50 MG/1
1 TABLET AS NEEDED TABLET, FILM COATED ORAL ONCE A DAY
Status: ACTIVE | COMMUNITY

## 2023-02-16 RX ORDER — ZOLPIDEM TARTRATE 5 MG/1
1 TABLET AT BEDTIME AS NEEDED TABLET, FILM COATED ORAL ONCE A DAY
Refills: 0 | Status: ACTIVE | COMMUNITY
Start: 1900-01-01

## 2023-02-16 RX ORDER — SIMVASTATIN 40 MG/1
TABLET, FILM COATED ORAL
Qty: 0 | Refills: 0 | Status: ACTIVE | COMMUNITY
Start: 1900-01-01

## 2023-02-16 RX ORDER — IRBESARTAN 150 MG/1
1 TABLET TABLET, FILM COATED ORAL ONCE A DAY
Refills: 0 | Status: ACTIVE | COMMUNITY
Start: 1900-01-01

## 2023-02-16 RX ORDER — ASPIRIN 81 MG/1
TABLET, COATED ORAL
Qty: 0 | Refills: 0 | Status: ACTIVE | COMMUNITY
Start: 1900-01-01

## 2023-02-16 RX ORDER — HYDROCHLOROTHIAZIDE 25 MG/1
TABLET ORAL
Qty: 0 | Refills: 0 | Status: ACTIVE | COMMUNITY
Start: 1900-01-01

## 2023-02-16 RX ORDER — ALENDRONATE SODIUM 70 MG/1
TABLET ORAL
Qty: 0 | Refills: 0 | Status: ACTIVE | COMMUNITY
Start: 1900-01-01

## 2023-02-16 RX ORDER — MESALAMINE 375 MG/1
4 CAPSULES IN THE MORNING CAPSULE, EXTENDED RELEASE ORAL ONCE A DAY
Qty: 360 | Refills: 1

## 2023-02-16 RX ORDER — DICYCLOMINE HYDROCHLORIDE 20 MG/1
TAKE 1 TABLET BY MOUTH FOUR TIMES DAILY TABLET ORAL
Qty: 360 TABLET | Refills: 0

## 2023-02-16 NOTE — HPI-TODAY'S VISIT:
Ms. Baez presents to clinic for follow-up.  She was last seen in August.  We are following her for ulcerative colitis managed on Apriso.  Recall last colonoscopy in January 2022 showed remission both endoscopically and histologically.  She also was noted to have diverticulosis and internal hemorrhoids.  Patient states that she still doing well on her medication.  She is having 2 bowel movements a day.  She denies any rectal bleeding.  She denies any abdominal pain or nausea.  In terms of extraintestinal manifestation she denies any mouth ulcers but has noticed some lip discoloration that she seen dermatology for in the past.  She denies any visual problems.  She has had some joint pain but this is due to a fall on one of her shoulders.  Regarding her reflux she is not really taking the omeprazole.  She states that she does not have breakthrough heartburn very often.

## 2023-02-19 LAB
A/G RATIO: 1.3
ABSOLUTE BASOPHILS: 27
ABSOLUTE EOSINOPHILS: 0
ABSOLUTE LYMPHOCYTES: 2407
ABSOLUTE MONOCYTES: 578
ABSOLUTE NEUTROPHILS: 3788
ALBUMIN: 3.5
ALKALINE PHOSPHATASE: 55
ALT (SGPT): 12
APPEARANCE: CLEAR
AST (SGOT): 16
BACTERIA: (no result)
BASOPHILS: 0.4
BILIRUBIN, TOTAL: 0.5
BILIRUBIN: NEGATIVE
BUN/CREATININE RATIO: (no result)
BUN: 15
C-REACTIVE PROTEIN, QUANT: 4.8
CALCIUM: 9.9
CARBON DIOXIDE, TOTAL: 30
CHLORIDE: 107
COLOR: YELLOW
CREATININE: 0.9
CULTURE: (no result)
EGFR: 65
EOSINOPHILS: 0
GLOBULIN, TOTAL: 2.7
GLUCOSE: 87
GLUCOSE: NEGATIVE
HEMATOCRIT: 37.3
HEMOGLOBIN: 12.1
HYALINE CAST: (no result)
KETONES: NEGATIVE
LEUKOCYTE ESTERASE: (no result)
LYMPHOCYTES: 35.4
MCH: 27.9
MCHC: 32.4
MCV: 85.9
MONOCYTES: 8.5
MPV: 12
NEUTROPHILS: 55.7
NITRITE: NEGATIVE
NOTE: (no result)
OCCULT BLOOD: NEGATIVE
PH: 7.5
PLATELET COUNT: 160
POTASSIUM: 3.8
PROTEIN, TOTAL: 6.2
PROTEIN: NEGATIVE
RBC: (no result)
RDW: 12.6
RED BLOOD CELL COUNT: 4.34
REFLEXIVE URINE CULTURE: (no result)
SODIUM: 145
SPECIFIC GRAVITY: 1.01
SQUAMOUS EPITHELIAL CELLS: (no result)
WBC: (no result)
WHITE BLOOD CELL COUNT: 6.8

## 2023-05-26 ENCOUNTER — ERX REFILL RESPONSE (OUTPATIENT)
Dept: URBAN - METROPOLITAN AREA CLINIC 40 | Facility: CLINIC | Age: 83
End: 2023-05-26

## 2023-05-26 RX ORDER — DICYCLOMINE HYDROCHLORIDE 20 MG/1
TAKE 1 TABLET BY MOUTH FOUR TIMES DAILY TABLET ORAL
Qty: 360 TABLET | Refills: 0 | OUTPATIENT

## 2023-06-01 ENCOUNTER — P2P PATIENT RECORD (OUTPATIENT)
Age: 83
End: 2023-06-01

## 2023-08-25 ENCOUNTER — OFFICE VISIT (OUTPATIENT)
Dept: URBAN - METROPOLITAN AREA CLINIC 74 | Facility: CLINIC | Age: 83
End: 2023-08-25
Payer: COMMERCIAL

## 2023-08-25 VITALS
DIASTOLIC BLOOD PRESSURE: 82 MMHG | TEMPERATURE: 98.1 F | HEART RATE: 71 BPM | BODY MASS INDEX: 32.95 KG/M2 | OXYGEN SATURATION: 97 % | WEIGHT: 142.4 LBS | HEIGHT: 55 IN | SYSTOLIC BLOOD PRESSURE: 136 MMHG

## 2023-08-25 DIAGNOSIS — K51.90 ULCERATIVE COLITIS: ICD-10-CM

## 2023-08-25 DIAGNOSIS — Z79.899 ENCOUNTER FOR LONG-TERM (CURRENT) DRUG USE: ICD-10-CM

## 2023-08-25 PROCEDURE — 99214 OFFICE O/P EST MOD 30 MIN: CPT | Performed by: INTERNAL MEDICINE

## 2023-08-25 RX ORDER — ALENDRONATE SODIUM 70 MG/1
TABLET ORAL
Qty: 0 | Refills: 0 | Status: ACTIVE | COMMUNITY
Start: 1900-01-01

## 2023-08-25 RX ORDER — IRBESARTAN 150 MG/1
1 TABLET TABLET, FILM COATED ORAL ONCE A DAY
Refills: 0 | Status: ACTIVE | COMMUNITY
Start: 1900-01-01

## 2023-08-25 RX ORDER — MESALAMINE 375 MG/1
4 CAPSULES IN THE MORNING CAPSULE, EXTENDED RELEASE ORAL ONCE A DAY
Qty: 360 | Refills: 1 | Status: ACTIVE | COMMUNITY

## 2023-08-25 RX ORDER — OMEPRAZOLE 40 MG/1
1 CAPSULE 30 MINUTES BEFORE MORNING MEAL CAPSULE, DELAYED RELEASE ORAL ONCE A DAY
Status: ON HOLD | COMMUNITY

## 2023-08-25 RX ORDER — HYDROCHLOROTHIAZIDE 25 MG/1
TABLET ORAL
Qty: 0 | Refills: 0 | Status: ACTIVE | COMMUNITY
Start: 1900-01-01

## 2023-08-25 RX ORDER — TRAMADOL HYDROCHLORIDE 50 MG/1
1 TABLET AS NEEDED TABLET, FILM COATED ORAL ONCE A DAY
Status: ACTIVE | COMMUNITY

## 2023-08-25 RX ORDER — MESALAMINE 375 MG/1
4 CAPSULES IN THE MORNING CAPSULE, EXTENDED RELEASE ORAL ONCE A DAY
Qty: 360 | Refills: 1
End: 2024-02-21

## 2023-08-25 RX ORDER — ZOLPIDEM TARTRATE 5 MG/1
1 TABLET AT BEDTIME AS NEEDED TABLET, FILM COATED ORAL ONCE A DAY
Refills: 0 | Status: ACTIVE | COMMUNITY
Start: 1900-01-01

## 2023-08-25 RX ORDER — SIMVASTATIN 40 MG/1
TABLET, FILM COATED ORAL
Qty: 0 | Refills: 0 | Status: ACTIVE | COMMUNITY
Start: 1900-01-01

## 2023-08-25 RX ORDER — DICYCLOMINE HYDROCHLORIDE 20 MG/1
TAKE 1 TABLET BY MOUTH FOUR TIMES DAILY TABLET ORAL
Qty: 360 TABLET | Refills: 0

## 2023-08-25 RX ORDER — ASPIRIN 81 MG/1
TABLET, COATED ORAL
Qty: 0 | Refills: 0 | Status: ACTIVE | COMMUNITY
Start: 1900-01-01

## 2023-08-25 RX ORDER — DICYCLOMINE HYDROCHLORIDE 20 MG/1
TAKE 1 TABLET BY MOUTH FOUR TIMES DAILY TABLET ORAL
Qty: 360 TABLET | Refills: 0 | Status: ACTIVE | COMMUNITY

## 2023-08-25 NOTE — HPI-TODAY'S VISIT:
Ms. Baez presents to clinic for follow-up.  She was last seen in February.  Recall we are following her for ulcerative colitis managed on Apriso.  Last colonoscopy for surveillance was January 2022.  That showed endoscopic as well as histologic remission.  She had diverticulosis and internal hemorrhoids as well as a leiomyoma.  Patient states clinically she still doing well on her medication.  She is having 1 bowel movement a day that is formed.  She denies any rectal bleeding.  She denies any abdominal pain or nausea.  She denies any extraintestinal manifestations such as rash or mouth ulcers.

## 2023-08-29 ENCOUNTER — P2P PATIENT RECORD (OUTPATIENT)
Age: 83
End: 2023-08-29

## 2023-08-29 LAB
A/G RATIO: 1.2
ABSOLUTE BASOPHILS: 38
ABSOLUTE EOSINOPHILS: 0
ABSOLUTE LYMPHOCYTES: 2029
ABSOLUTE MONOCYTES: 486
ABSOLUTE NEUTROPHILS: 5046
ALBUMIN: 3.6
ALKALINE PHOSPHATASE: 56
ALT (SGPT): 10
APPEARANCE: (no result)
AST (SGOT): 15
BACTERIA: (no result)
BASOPHILS: 0.5
BILIRUBIN, TOTAL: 0.5
BILIRUBIN: NEGATIVE
BUN/CREATININE RATIO: 17
BUN: 18
C-REACTIVE PROTEIN, QUANT: 7.4
CALCIUM: 10.5
CARBON DIOXIDE, TOTAL: 25
CHLORIDE: 108
COLOR: YELLOW
CREATININE: 1.07
CULTURE: (no result)
EGFR: 52
EOSINOPHILS: 0
GLOBULIN, TOTAL: 2.9
GLUCOSE: 124
GLUCOSE: NEGATIVE
HEMATOCRIT: 38.6
HEMOGLOBIN: 13
HYALINE CAST: (no result)
KETONES: NEGATIVE
LEUKOCYTE ESTERASE: (no result)
LYMPHOCYTES: 26.7
MCH: 28.6
MCHC: 33.7
MCV: 84.8
MONOCYTES: 6.4
MPV: 11.8
NEUTROPHILS: 66.4
NITRITE: POSITIVE
OCCULT BLOOD: NEGATIVE
PH: 6.5
PLATELET COUNT: 163
POTASSIUM: 3.7
PROTEIN, TOTAL: 6.5
PROTEIN: (no result)
RBC: (no result)
RDW: 12.9
RED BLOOD CELL COUNT: 4.55
REFLEXIVE URINE CULTURE: (no result)
SODIUM: 142
SPECIFIC GRAVITY: 1.02
SQUAMOUS EPITHELIAL CELLS: (no result)
WBC: (no result)
WHITE BLOOD CELL COUNT: 7.6

## 2023-11-07 ENCOUNTER — TELEPHONE ENCOUNTER (OUTPATIENT)
Dept: URBAN - METROPOLITAN AREA CLINIC 74 | Facility: CLINIC | Age: 83
End: 2023-11-07

## 2023-11-07 RX ORDER — DICYCLOMINE HYDROCHLORIDE 20 MG/1
TAKE 1 TABLET BY MOUTH FOUR TIMES DAILY TABLET ORAL
Qty: 360 TABLET | Refills: 0
End: 2024-02-05

## 2024-01-25 ENCOUNTER — P2P PATIENT RECORD (OUTPATIENT)
Age: 84
End: 2024-01-25

## 2024-02-09 ENCOUNTER — OV EP (OUTPATIENT)
Dept: URBAN - METROPOLITAN AREA CLINIC 74 | Facility: CLINIC | Age: 84
End: 2024-02-09

## 2024-02-09 RX ORDER — IRBESARTAN 150 MG/1
1 TABLET TABLET, FILM COATED ORAL ONCE A DAY
Refills: 0 | Status: ACTIVE | COMMUNITY
Start: 1900-01-01

## 2024-02-09 RX ORDER — HYDROCHLOROTHIAZIDE 25 MG/1
TABLET ORAL
Qty: 0 | Refills: 0 | Status: ACTIVE | COMMUNITY
Start: 1900-01-01

## 2024-02-09 RX ORDER — ASPIRIN 81 MG/1
TABLET, COATED ORAL
Qty: 0 | Refills: 0 | Status: ACTIVE | COMMUNITY
Start: 1900-01-01

## 2024-02-09 RX ORDER — TRAMADOL HYDROCHLORIDE 50 MG/1
1 TABLET AS NEEDED TABLET, FILM COATED ORAL ONCE A DAY
Status: ACTIVE | COMMUNITY

## 2024-02-09 RX ORDER — SIMVASTATIN 40 MG/1
TABLET, FILM COATED ORAL
Qty: 0 | Refills: 0 | Status: ACTIVE | COMMUNITY
Start: 1900-01-01

## 2024-02-09 RX ORDER — MESALAMINE 375 MG/1
4 CAPSULES IN THE MORNING CAPSULE, EXTENDED RELEASE ORAL ONCE A DAY
Qty: 360 | Refills: 1 | Status: ACTIVE | COMMUNITY
End: 2024-02-21

## 2024-02-09 RX ORDER — ALENDRONATE SODIUM 70 MG/1
TABLET ORAL
Qty: 0 | Refills: 0 | Status: ACTIVE | COMMUNITY
Start: 1900-01-01

## 2024-02-09 RX ORDER — ZOLPIDEM TARTRATE 5 MG/1
1 TABLET AT BEDTIME AS NEEDED TABLET, FILM COATED ORAL ONCE A DAY
Refills: 0 | Status: ACTIVE | COMMUNITY
Start: 1900-01-01

## 2024-02-09 RX ORDER — OMEPRAZOLE 40 MG/1
1 CAPSULE 30 MINUTES BEFORE MORNING MEAL CAPSULE, DELAYED RELEASE ORAL ONCE A DAY
Status: ON HOLD | COMMUNITY